# Patient Record
Sex: MALE | Race: WHITE | Employment: FULL TIME | URBAN - METROPOLITAN AREA
[De-identification: names, ages, dates, MRNs, and addresses within clinical notes are randomized per-mention and may not be internally consistent; named-entity substitution may affect disease eponyms.]

---

## 2017-01-09 ENCOUNTER — HOSPITAL ENCOUNTER (OUTPATIENT)
Dept: WOUND CARE | Age: 52
Discharge: HOME | End: 2017-01-09
Admitting: SURGERY

## 2017-01-27 ENCOUNTER — TELEPHONE (OUTPATIENT)
Dept: WOUND CARE | Age: 52
End: 2017-01-27

## 2017-02-15 ENCOUNTER — HOSPITAL ENCOUNTER (OUTPATIENT)
Dept: WOUND CARE | Age: 52
Discharge: HOME | End: 2017-02-15
Attending: PODIATRIST | Admitting: PODIATRIST

## 2017-02-15 VITALS
WEIGHT: 197.31 LBS | BODY MASS INDEX: 24.53 KG/M2 | TEMPERATURE: 98.4 F | SYSTOLIC BLOOD PRESSURE: 116 MMHG | HEIGHT: 75 IN | HEART RATE: 80 BPM | DIASTOLIC BLOOD PRESSURE: 78 MMHG | RESPIRATION RATE: 16 BRPM

## 2017-02-15 RX ORDER — LIDOCAINE 50 MG/G
OINTMENT TOPICAL PRN
Status: DISCONTINUED | OUTPATIENT
Start: 2017-02-15 | End: 2017-02-16 | Stop reason: HOSPADM

## 2017-02-22 ENCOUNTER — HOSPITAL ENCOUNTER (OUTPATIENT)
Dept: WOUND CARE | Age: 52
Discharge: HOME | End: 2017-02-22
Attending: PODIATRIST | Admitting: PODIATRIST

## 2017-02-22 VITALS
TEMPERATURE: 98 F | HEART RATE: 75 BPM | SYSTOLIC BLOOD PRESSURE: 122 MMHG | RESPIRATION RATE: 20 BRPM | DIASTOLIC BLOOD PRESSURE: 77 MMHG

## 2017-02-22 RX ORDER — LIDOCAINE 50 MG/G
OINTMENT TOPICAL ONCE
Status: DISCONTINUED | OUTPATIENT
Start: 2017-02-22 | End: 2017-02-23 | Stop reason: HOSPADM

## 2017-02-24 ENCOUNTER — INITIAL CONSULT (OUTPATIENT)
Dept: SURGERY | Age: 52
End: 2017-02-24

## 2017-02-24 VITALS
HEART RATE: 80 BPM | BODY MASS INDEX: 24.37 KG/M2 | DIASTOLIC BLOOD PRESSURE: 84 MMHG | SYSTOLIC BLOOD PRESSURE: 139 MMHG | HEIGHT: 75 IN | WEIGHT: 196 LBS

## 2017-02-24 DIAGNOSIS — L97.921 TRAUMATIC LEG ULCER, LEFT, LIMITED TO BREAKDOWN OF SKIN (HCC): Primary | ICD-10-CM

## 2017-02-24 DIAGNOSIS — I87.2 VENOUS INSUFFICIENCY: ICD-10-CM

## 2017-02-24 PROCEDURE — 99244 OFF/OP CNSLTJ NEW/EST MOD 40: CPT | Performed by: SURGERY

## 2017-02-24 ASSESSMENT — ENCOUNTER SYMPTOMS
RESPIRATORY NEGATIVE: 1
EYES NEGATIVE: 1
GASTROINTESTINAL NEGATIVE: 1

## 2017-03-01 ENCOUNTER — HOSPITAL ENCOUNTER (OUTPATIENT)
Dept: WOUND CARE | Age: 52
Discharge: HOME | End: 2017-03-01
Attending: NURSE PRACTITIONER | Admitting: NURSE PRACTITIONER

## 2017-03-01 VITALS
HEART RATE: 77 BPM | TEMPERATURE: 98.8 F | BODY MASS INDEX: 24.55 KG/M2 | WEIGHT: 196.43 LBS | DIASTOLIC BLOOD PRESSURE: 73 MMHG | RESPIRATION RATE: 16 BRPM | SYSTOLIC BLOOD PRESSURE: 159 MMHG

## 2017-03-01 DIAGNOSIS — L97.922 TRAUMATIC LEG ULCER, LEFT, WITH FAT LAYER EXPOSED (HCC): Primary | ICD-10-CM

## 2017-03-01 PROCEDURE — 11042 DBRDMT SUBQ TIS 1ST 20SQCM/<: CPT | Performed by: NURSE PRACTITIONER

## 2017-03-01 RX ORDER — LIDOCAINE 50 MG/G
OINTMENT TOPICAL PRN
Status: DISCONTINUED | OUTPATIENT
Start: 2017-03-01 | End: 2017-03-02 | Stop reason: HOSPADM

## 2017-03-08 ENCOUNTER — HOSPITAL ENCOUNTER (OUTPATIENT)
Dept: WOUND CARE | Age: 52
Discharge: HOME | End: 2017-03-08
Attending: PODIATRIST | Admitting: PODIATRIST

## 2017-03-08 VITALS
RESPIRATION RATE: 16 BRPM | SYSTOLIC BLOOD PRESSURE: 145 MMHG | DIASTOLIC BLOOD PRESSURE: 76 MMHG | HEART RATE: 82 BPM | TEMPERATURE: 98.9 F

## 2017-03-08 RX ORDER — LIDOCAINE 50 MG/G
OINTMENT TOPICAL PRN
Status: DISCONTINUED | OUTPATIENT
Start: 2017-03-08 | End: 2017-03-09 | Stop reason: HOSPADM

## 2017-03-22 ENCOUNTER — HOSPITAL ENCOUNTER (OUTPATIENT)
Dept: WOUND CARE | Age: 52
Discharge: HOME | End: 2017-03-22
Admitting: PODIATRIST

## 2017-03-22 VITALS
HEART RATE: 71 BPM | RESPIRATION RATE: 17 BRPM | SYSTOLIC BLOOD PRESSURE: 129 MMHG | DIASTOLIC BLOOD PRESSURE: 74 MMHG | TEMPERATURE: 98.2 F

## 2017-03-22 ASSESSMENT — PAIN SCALES - GENERAL: PAINLEVEL_OUTOF10: 0

## 2018-05-29 ENCOUNTER — HOSPITAL ENCOUNTER (OUTPATIENT)
Dept: OTHER | Age: 53
Discharge: HOME | End: 2018-05-29
Attending: ANESTHESIOLOGY | Admitting: ANESTHESIOLOGY

## 2018-05-29 DIAGNOSIS — G89.29 CHRONIC LOW BACK PAIN WITHOUT SCIATICA, UNSPECIFIED BACK PAIN LATERALITY: ICD-10-CM

## 2018-05-29 DIAGNOSIS — M54.50 CHRONIC LOW BACK PAIN WITHOUT SCIATICA, UNSPECIFIED BACK PAIN LATERALITY: ICD-10-CM

## 2022-07-22 ENCOUNTER — HOSPITAL ENCOUNTER (EMERGENCY)
Age: 57
Discharge: HOME | End: 2022-07-22
Attending: EMERGENCY MEDICINE
Payer: COMMERCIAL

## 2022-07-22 VITALS
SYSTOLIC BLOOD PRESSURE: 156 MMHG | BODY MASS INDEX: 23.3 KG/M2 | WEIGHT: 187.39 LBS | HEART RATE: 93 BPM | RESPIRATION RATE: 19 BRPM | TEMPERATURE: 97.2 F | DIASTOLIC BLOOD PRESSURE: 96 MMHG | OXYGEN SATURATION: 98 % | HEIGHT: 75 IN

## 2022-07-22 DIAGNOSIS — T63.441A BEE STING REACTION, ACCIDENTAL OR UNINTENTIONAL, INITIAL ENCOUNTER: Primary | ICD-10-CM

## 2022-07-22 PROCEDURE — 99283 EMERGENCY DEPT VISIT LOW MDM: CPT

## 2022-07-22 PROCEDURE — 6370000000 HC RX 637 (ALT 250 FOR IP): Performed by: EMERGENCY MEDICINE

## 2022-07-22 RX ORDER — PREDNISONE 20 MG/1
40 TABLET ORAL ONCE
Status: COMPLETED | OUTPATIENT
Start: 2022-07-22 | End: 2022-07-22

## 2022-07-22 RX ORDER — IBUPROFEN 400 MG/1
400 TABLET ORAL ONCE
Status: COMPLETED | OUTPATIENT
Start: 2022-07-22 | End: 2022-07-22

## 2022-07-22 RX ORDER — EPINEPHRINE 0.3 MG/.3ML
0.3 INJECTION SUBCUTANEOUS
Qty: 1 EACH | Refills: 0 | Status: SHIPPED | OUTPATIENT
Start: 2022-07-22 | End: 2022-07-22

## 2022-07-22 RX ORDER — CETIRIZINE HYDROCHLORIDE 10 MG/1
10 TABLET ORAL DAILY
Qty: 30 TABLET | Refills: 0 | Status: SHIPPED | OUTPATIENT
Start: 2022-07-22 | End: 2022-08-21

## 2022-07-22 RX ORDER — FAMOTIDINE 20 MG/1
20 TABLET, FILM COATED ORAL ONCE
Status: COMPLETED | OUTPATIENT
Start: 2022-07-22 | End: 2022-07-22

## 2022-07-22 RX ORDER — PREDNISONE 20 MG/1
20 TABLET ORAL 2 TIMES DAILY
Qty: 6 TABLET | Refills: 0 | Status: SHIPPED | OUTPATIENT
Start: 2022-07-22 | End: 2022-07-25

## 2022-07-22 RX ADMIN — PREDNISONE 40 MG: 20 TABLET ORAL at 13:40

## 2022-07-22 RX ADMIN — FAMOTIDINE 20 MG: 20 TABLET ORAL at 13:40

## 2022-07-22 RX ADMIN — IBUPROFEN 400 MG: 400 TABLET, FILM COATED ORAL at 13:40

## 2022-07-22 ASSESSMENT — PAIN SCALES - GENERAL: PAINLEVEL_OUTOF10: 7

## 2022-07-22 ASSESSMENT — PAIN - FUNCTIONAL ASSESSMENT: PAIN_FUNCTIONAL_ASSESSMENT: NONE - DENIES PAIN

## 2022-07-22 NOTE — ED NOTES
Discharge education complete. Patient educated on new medications, follow up care and reasons to seek medical attention. Patient denies questions or concerns, no sxs of distress noted , patient declined wheel chair at time of discharge.         Nicole Multani RN  07/22/22 2600

## 2022-07-23 NOTE — ED PROVIDER NOTES
EMERGENCY DEPARTMENT PROVIDER NOTE    Patient Identification  Pt Name: Shonda George  MRN: [id number]  Reyesgfnathan 1965  Date of evaluation: 7/22/2022  Provider: Evgeny Crane DO  PCP: Alyssa Kwok MD    Chief Complaint  Insect Bite (Patient reports being stung in neck by bee around 1200, reports taking Benadryl 50  mg PO prior to arrival, reports sever swelling in past after bee stings, denies difficulty breathing , redness and edema to neck noted, respirations even and unlabored. )      HPI  (History provided by patient)  This is a 62 y.o. male with pertinent past medical history of COPD who was brought in by self for bee sting which occurred just prior to arrival.  Patient states he was working outside when he saw the bee flying towards him and then he was then stung in the middle of his throat. Associated with mild swelling and itching, nothing seems to make the symptoms any better or worse. Took 50 mg of Benadryl prior to arrival.  Patient denies history of bee venom allergy, however states he was stung on his right arm previously which caused his right arm to swell severely and he is concerned this could occur to his throat. Currently he denies any voice changes, difficulty swallowing, shortness of breath, chest pain or nausea. .     ROS    Const:  No fevers, no chills, no generalized weakness  Skin:  No rash, +insect sting  ENT:  No sore throat, no difficulty swallowing, no ear pain, no sinus pain or congestion  Card:  No chest pain, no palpitations  Resp:  No shortness of breath, no cough  Abd:  No abdominal pain, no nausea, no vomiting  MSK:  No joint pain, no myalgia  Neuro:  No focal weakness, no headache, no paresthesia    All other systems reviewed and negative unless otherwise noted in HPI      I have reviewed the following nursing documentation:  Allergies: Bee venom, Codeine, Pradaxa [dabigatran etexilate mesylate], and Xarelto [rivaroxaban]    Past medical history:   Past Medical History:   Diagnosis Date    Arthritis      Past surgical history:   Past Surgical History:   Procedure Laterality Date    ATRIAL ABLATION SURGERY         Home medications:   Discharge Medication List as of 7/22/2022  2:16 PM        CONTINUE these medications which have NOT CHANGED    Details   oxyCODONE (ROXICODONE) 5 MG immediate release tablet Take 5 mg by mouth 3 times daily. aspirin 325 MG EC tablet Take 325 mg by mouth daily. LORazepam (ATIVAN) 2 MG tablet Take 2 mg by mouth nightly as needed. therapeutic multivitamin-minerals (THERAGRAN-M) tablet Take 1 tablet by mouth daily. Social history:  reports that he has been smoking cigarettes. He has a 44.00 pack-year smoking history. He does not have any smokeless tobacco history on file. He reports current alcohol use of about 12.0 standard drinks per week. He reports that he does not use drugs. Family history:    Family History   Problem Relation Age of Onset    Cancer Mother         lymphoma    Seizures Father          Exam  ED Triage Vitals [07/22/22 1322]   BP Temp Temp Source Heart Rate Resp SpO2 Height Weight   (!) 156/96 97.2 °F (36.2 °C) Oral 93 19 98 % 6' 3\" (1.905 m) 187 lb 6.3 oz (85 kg)     Nursing note and vitals reviewed. Constitutional: Well developed, well nourished. Non-toxic in appearance. HENT:      Head: Normocephalic and atraumatic. Ears: External ears normal.      Nose: Nose normal.     Mouth: Membrane mucosa moist and pink. No tongue or lip edema, normal appearance of posterior oropharynx. Speech clear and tolerating oral secretions. Eyes: Anicteric sclera. No discharge. Neck: Supple. Trachea midline. Apparent insect sting overlying cricoid tissues, no retained stinger or other foreign body. There is a small wheal with mild surrounding erythema at this location. No significant soft tissue edema. Cardiovascular: RRR; no murmurs, rubs, or gallops.   Pulmonary/Chest: Effort normal. No respiratory distress. Faint scattered wheezes (patient reports chronic from COPD). . No stridor. No rales. Musculoskeletal: Moves all extremities. No gross deformity. Neurological: Alert and oriented. Face symmetric. Speech is clear. Skin: Warm and dry. No rash. Psychiatric: Normal mood and affect. Behavior is normal.      Radiology  No orders to display       Labs  No results found for this visit on 07/22/22. Screenings   Chery Coma Scale  Eye Opening: Spontaneous  Best Verbal Response: Oriented  Best Motor Response: Obeys commands  Chery Coma Scale Score: 15       Is this patient to be included in the SEP-1 Core Measure due to severe sepsis or septic shock? No   Exclusion criteria - the patient is NOT to be included for SEP-1 Core Measure due to: Infection is not suspected      MDM and ED Course    Patient afebrile and nontoxic. No distress. No evidence of significant cervical soft tissue swelling or upper airway obstruction. No findings of cellulitis or other infectious etiology. Patient with faint wheezes on exam, however these are chronic from COPD, no findings to suggest anaphylaxis. No anticipated benefit from epinephrine at this time. Doubt true allergic reaction, presentation most consistent with localized inflammatory response, however given patient's reported history of severe edema with lasting, will treat with steroids and antihistamines. Patient was observed in the emergency department for 1 hour, on reevaluation he reported feeling improved and there is also clinical improvement of the appearance of his insect sting. We will continue short course of steroids. EpiPen prescribed at patient's request and I did instruct him on indications for usage. Safe for discharge to self-care with close PCP follow-up. Patient is agreeable with plan and feels comfortable returning to home. Strict return precautions discussed. I Dr. Jose Aranda am the primary clinician of record. Final Impression  1. Bee sting reaction, accidental or unintentional, initial encounter        Blood pressure (!) 156/96, pulse 93, temperature 97.2 °F (36.2 °C), temperature source Oral, resp. rate 19, height 6' 3\" (1.905 m), weight 187 lb 6.3 oz (85 kg), SpO2 98 %. Disposition:  DISPOSITION Decision To Discharge 07/22/2022 02:30:44 PM      Patient Referrals:  Penny Bueno MD  [address]  [phone]    In 2 days        Discharge Medications:  Discharge Medication List as of 7/22/2022  2:16 PM        START taking these medications    Details   cetirizine (ZYRTEC) 10 MG tablet Take 1 tablet by mouth in the morning., Disp-30 tablet, R-0Print      predniSONE (DELTASONE) 20 MG tablet Take 1 tablet by mouth in the morning and 1 tablet before bedtime. Do all this for 3 days. Take your first dose on 7/23/22., Disp-6 tablet, R-0Print      EPINEPHrine (EPIPEN 2-ELENI) 0.3 MG/0.3ML SOAJ injection Inject 0.3 mLs into the muscle once as needed (severe allergic reaction) Inject into lateral thigh muscle., Disp-1 each, R-0Print             Discontinued Medications:  Discharge Medication List as of 7/22/2022  2:16 PM          This chart was generated using the GILU[employer] dictation system. I created this record but it may contain dictation errors given the limitations of this technology.     Kylah Sloan DO (electronically signed)  Attending Emergency Physician         Kylah Sloan DO  07/23/22 1863